# Patient Record
(demographics unavailable — no encounter records)

---

## 2025-05-02 NOTE — END OF VISIT
[FreeTextEntry3] : I, Dr. Kong personally performed the evaluation and management (E/M) services including all necessary procedures, for this new patient. That E/M includes conducting the clinically appropriate initial history &/or exam, assessing all conditions, and establishing the plan of care. Today, my HONG, Radha Prescott, was here to observe &/or participate in the visit & follow plan of care established by me.

## 2025-05-02 NOTE — ASSESSMENT
[FreeTextEntry1] : Clogged ears massive cerumen impaction curetted out normal tympanic membranes irritation left external auditory canal Floxin otic drops follow-up as needed.

## 2025-05-02 NOTE — PHYSICAL EXAM
[Midline] : trachea located in midline position [Normal] : no rashes [de-identified] : cerumen in the ear canals; onceremoved ther eis a small abrasion int he left eac but right EAC normal

## 2025-05-02 NOTE — HISTORY OF PRESENT ILLNESS
[de-identified] : Patient has had a few months of left ear clogging, worse after a shower. On occasion he will use hydrogen peroxide in the ears to clean. He does not have any pain in the ears, ringing in the ears dizziness.  He does not have any nasal congestion  but does have seasonal allergies historically

## 2025-06-18 NOTE — HEALTH RISK ASSESSMENT
[Patient reported colonoscopy was abnormal] : Patient reported colonoscopy was abnormal [Good] : ~his/her~  mood as  good [No Retinopathy] : No retinopathy [Never (0 pts)] : Never (0 points) [No falls in past year] : Patient reported no falls in the past year [Never] : Never [NO] : No [HIV test declined] : HIV test declined [Hepatitis C test declined] : Hepatitis C test declined [None] : None [Alone] : lives alone [Retired] : retired [Single] : single [Feels Safe at Home] : Feels safe at home [Fully functional (bathing, dressing, toileting, transferring, walking, feeding)] : Fully functional (bathing, dressing, toileting, transferring, walking, feeding) [Fully functional (using the telephone, shopping, preparing meals, housekeeping, doing laundry, using] : Fully functional and needs no help or supervision to perform IADLs (using the telephone, shopping, preparing meals, housekeeping, doing laundry, using transportation, managing medications and managing finances) [Smoke Detector] : smoke detector [Carbon Monoxide Detector] : carbon monoxide detector [Seat Belt] :  uses seat belt [Patient/Caregiver not ready to engage] : , patient/caregiver not ready to engage [Designated Healthcare Proxy] : Designated healthcare proxy [Name: ___] : Health Care Proxy's Name: [unfilled]  [Relationship: ___] : Relationship: [unfilled] [de-identified] : ENT - wax removal and hearing test - all ok [de-identified] : see hpi [de-identified] : mostly fish - fruits and veggies. no whiite bread. intermittent fasting.  [EyeExamDate] : 05/25 [Change in mental status noted] : No change in mental status noted [Sexually Active] : not sexually active [Reports changes in hearing] : Reports no changes in hearing [Reports changes in vision] : Reports no changes in vision [ColonoscopyDate] : 09/22 [ColonoscopyComments] : polyp - f/u in 2027 [AdvancecareDate] : 06/25

## 2025-06-18 NOTE — HEALTH RISK ASSESSMENT
[Patient reported colonoscopy was abnormal] : Patient reported colonoscopy was abnormal [Good] : ~his/her~  mood as  good [No Retinopathy] : No retinopathy [Never (0 pts)] : Never (0 points) [No falls in past year] : Patient reported no falls in the past year [Never] : Never [NO] : No [HIV test declined] : HIV test declined [Hepatitis C test declined] : Hepatitis C test declined [None] : None [Alone] : lives alone [Retired] : retired [Single] : single [Feels Safe at Home] : Feels safe at home [Fully functional (bathing, dressing, toileting, transferring, walking, feeding)] : Fully functional (bathing, dressing, toileting, transferring, walking, feeding) [Fully functional (using the telephone, shopping, preparing meals, housekeeping, doing laundry, using] : Fully functional and needs no help or supervision to perform IADLs (using the telephone, shopping, preparing meals, housekeeping, doing laundry, using transportation, managing medications and managing finances) [Smoke Detector] : smoke detector [Carbon Monoxide Detector] : carbon monoxide detector [Seat Belt] :  uses seat belt [Patient/Caregiver not ready to engage] : , patient/caregiver not ready to engage [Designated Healthcare Proxy] : Designated healthcare proxy [Name: ___] : Health Care Proxy's Name: [unfilled]  [Relationship: ___] : Relationship: [unfilled] [de-identified] : ENT - wax removal and hearing test - all ok [de-identified] : see hpi [de-identified] : mostly fish - fruits and veggies. no whiite bread. intermittent fasting.  [EyeExamDate] : 05/25 [Change in mental status noted] : No change in mental status noted [Sexually Active] : not sexually active [Reports changes in hearing] : Reports no changes in hearing [Reports changes in vision] : Reports no changes in vision [ColonoscopyDate] : 09/22 [ColonoscopyComments] : polyp - f/u in 2027 [AdvancecareDate] : 06/25

## 2025-06-18 NOTE — HISTORY OF PRESENT ILLNESS
[FreeTextEntry1] : Annual CPE [de-identified] : 60 yo M with h/o leukopenia, HLD, preDM, colonic polyp (tubular adenoma) thrombocytopenia, bradycardia  Interval: seen for CPE 2024 with NP seen ophtho - to see again later today.  RE HR: has always tracey low - runs frequerntly and gyn daily. No sx of dizziness and lightheaded ness ever. -162 with exercise started using creatine - helps with his recovery time after exercise.   Prior 2/8/23  last yr 10 yr risk 7.2 - need to recheck cholesterol along with other labs.  taking Centrum silver Prostate 5 KX - saw [employer] [place] fish oil  Prior 2/2/22 s/p fall 3/21 - using meloxicam for wrist - only PRN and tylenol occ RE elevated CPK: h/o asymptomatic elevation of CPK - first noted in 1980 Reports he has changed his diet - eating less dairy and no red meat - has lost 10 lbs since making that change.  RE HCM: got all  3 COVID shots  sleeping well RE nocturia: taking prostate xl - bought on [employer] - doesn't have bottle with him but thinks the main ingredient is saw palmetto

## 2025-06-18 NOTE — HISTORY OF PRESENT ILLNESS
[FreeTextEntry1] : Annual CPE [de-identified] : 58 yo M with h/o leukopenia, HLD, preDM, colonic polyp (tubular adenoma) thrombocytopenia, bradycardia  Interval: seen for CPE 2024 with NP seen ophtho - to see again later today.  RE HR: has always tracey low - runs frequerntly and gyn daily. No sx of dizziness and lightheaded ness ever. -162 with exercise started using creatine - helps with his recovery time after exercise.   Prior 2/8/23  last yr 10 yr risk 7.2 - need to recheck cholesterol along with other labs.  taking Centrum silver Prostate 5 KX - saw palm[employer] [place] fish oil  Prior 2/2/22 s/p fall 3/21 - using meloxicam for wrist - only PRN and tylenol occ RE elevated CPK: h/o asymptomatic elevation of CPK - first noted in 1980 Reports he has changed his diet - eating less dairy and no red meat - has lost 10 lbs since making that change.  RE HCM: got all  3 COVID shots  sleeping well RE nocturia: taking prostate xl - bought on [employer] - doesn't have bottle with him but thinks the main ingredient is saw palmetto